# Patient Record
Sex: FEMALE | Race: WHITE | NOT HISPANIC OR LATINO | URBAN - METROPOLITAN AREA
[De-identification: names, ages, dates, MRNs, and addresses within clinical notes are randomized per-mention and may not be internally consistent; named-entity substitution may affect disease eponyms.]

---

## 2020-03-09 ENCOUNTER — OFFICE VISIT (OUTPATIENT)
Dept: URBAN - METROPOLITAN AREA CLINIC 71 | Facility: CLINIC | Age: 75
End: 2020-03-09
Payer: MEDICARE

## 2020-03-09 DIAGNOSIS — H35.361 DRUSEN (DEGENERATIVE) OF MACULA, RIGHT EYE: ICD-10-CM

## 2020-03-09 PROCEDURE — 92134 CPTRZ OPH DX IMG PST SGM RTA: CPT | Performed by: OPTOMETRIST

## 2020-03-09 PROCEDURE — 92014 COMPRE OPH EXAM EST PT 1/>: CPT | Performed by: OPTOMETRIST

## 2020-03-09 ASSESSMENT — VISUAL ACUITY
OS: 20/40
OD: 20/25+

## 2020-03-09 NOTE — IMPRESSION/PLAN
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 Vision affected OD. Plan: Cataracts account for the patient's complaints. Patient understands changing glasses will not significantly improve vision. Cataract surgery should improve vision. Patient willing to have surgery. Recommend cataract surgery OD.

## 2020-03-09 NOTE — IMPRESSION/PLAN
Impression: Other secondary cataract, left eye: H26.492. symptomatic OS Plan: recommend preop for YAG cap OS.

## 2020-03-09 NOTE — IMPRESSION/PLAN
Impression: Ocular hypertension, bilateral: H40.053. had elevated IOP OU before and during cat surgery OS. IOP good today OU. VF/OCT 2/19/18: WNL OU. Pachs: thin OU. Plan: Discussed. Advise further testing. Return for VF/OCT/IOP 1 mo.

## 2020-03-09 NOTE — IMPRESSION/PLAN
Impression: Drusen (degenerative) of macula, right eye: H35.361. mild. OCT mac 3/9/20: no SRF/IRF OU. Plan: observe.

## 2020-07-17 ENCOUNTER — OFFICE VISIT (OUTPATIENT)
Dept: URBAN - METROPOLITAN AREA CLINIC 71 | Facility: CLINIC | Age: 75
End: 2020-07-17
Payer: MEDICARE

## 2020-07-17 DIAGNOSIS — H25.11 NUCLEAR SCLEROSIS CATARACT OF RIGHT EYE: ICD-10-CM

## 2020-07-17 DIAGNOSIS — Z96.1 PRESENCE OF INTRAOCULAR LENS: ICD-10-CM

## 2020-07-17 DIAGNOSIS — H40.053 OCULAR HYPERTENSION, BILATERAL: ICD-10-CM

## 2020-07-17 PROCEDURE — 92014 COMPRE OPH EXAM EST PT 1/>: CPT | Performed by: OPTOMETRIST

## 2020-07-17 PROCEDURE — 92133 CPTRZD OPH DX IMG PST SGM ON: CPT | Performed by: OPTOMETRIST

## 2020-07-17 PROCEDURE — 92083 EXTENDED VISUAL FIELD XM: CPT | Performed by: OPTOMETRIST

## 2020-07-17 ASSESSMENT — VISUAL ACUITY
OD: 20/25
OS: 20/60

## 2020-07-17 ASSESSMENT — INTRAOCULAR PRESSURE
OD: 12
OS: 13

## 2020-07-17 NOTE — IMPRESSION/PLAN
Impression: Ocular hypertension, bilateral: H40.053. had elevated IOP OU before and during cat surgery OS. IOP good today OU. Andrews: VF 7/17/2020: Possible early inf arc OD, grossly within normal limits (3+ PCO),possible eraly changes OS. OCT RNFL: WNL OD, unable to obtain scan of OS ON. Pachs: thin OU. Plan: Discussed. Advise further testing.  next 7/2021 for VF/OCT rnfl

## 2020-08-11 ENCOUNTER — PRE-OPERATIVE VISIT (OUTPATIENT)
Dept: URBAN - METROPOLITAN AREA CLINIC 71 | Facility: CLINIC | Age: 75
End: 2020-08-11
Payer: MEDICARE

## 2020-08-11 DIAGNOSIS — H43.813 VITREOUS DEGENERATION, BILATERAL: ICD-10-CM

## 2020-08-11 DIAGNOSIS — H52.223 REGULAR ASTIGMATISM, BILATERAL: ICD-10-CM

## 2020-08-11 DIAGNOSIS — H25.811 COMBINED FORMS OF AGE-RELATED CATARACT, RIGHT EYE: ICD-10-CM

## 2020-08-11 DIAGNOSIS — H26.492 OTHER SECONDARY CATARACT, LEFT EYE: Primary | ICD-10-CM

## 2020-08-11 PROCEDURE — 92014 COMPRE OPH EXAM EST PT 1/>: CPT | Performed by: OPHTHALMOLOGY

## 2020-08-11 PROCEDURE — 92136 OPHTHALMIC BIOMETRY: CPT | Performed by: OPHTHALMOLOGY

## 2020-08-11 RX ORDER — TOBRAMYCIN 3 MG/ML
0.3 % SOLUTION/ DROPS OPHTHALMIC
Qty: 1 | Refills: 1 | Status: INACTIVE
Start: 2020-08-23 | End: 2020-09-01

## 2020-08-11 ASSESSMENT — PACHYMETRY
OS: 23.25
OS: 4.75
OD: 2.93
OD: 22.98

## 2020-08-11 ASSESSMENT — VISUAL ACUITY
OD: 20/25
OS: 20/60

## 2020-08-11 ASSESSMENT — INTRAOCULAR PRESSURE
OD: 12
OS: 13

## 2020-08-11 NOTE — IMPRESSION/PLAN
Impression: Regular astigmatism, bilateral: H52.223. Plan: WILL CONTINUE TO OBSERVE. PATIENT AWARE THAT GLASSES MAY STILL BE NEEDED AFTER SURGERY.

## 2020-08-11 NOTE — IMPRESSION/PLAN
Impression: Other secondary cataract, left eye: H26.492. Plan: OS: Discussed diagnosis in detail with patient. Discussed treatment options with patient. Discussed risks and benefits and patient understands. Advised patient of condition. Reassured patient of current condition and treatment. Discussed signs and symptoms of retinal detachment. Discussed signs and symptoms of PVD/floaters. Discussed risks of progression. Schedule YAG PI - OS. Surgical treatment is necessary to omprove vision. Patient elects to have surgery. Surgical risks and benefits were discussed, explained and understood by patient. Pre op instructions given and understood. Post op instructions given and understood. Educational materials provided: Yag Capsulotomy. AZAM OKAY TO PROCEED WITH YAG LASER OS ONLY BEFORE PROCEEDING WITH OD CATARACT SURGERY.

## 2020-08-11 NOTE — IMPRESSION/PLAN
Impression: Combined forms of age-related cataract, right eye: H25.811. Plan: OD: Discussed diagnosis in detail with patient. Discussed treatment options with patient. Discussed risks and benefits and patient understands. Advised patient of condition. Reassured patient of current condition and treatment. Emphasized and explained compliance. Discussed signs and symptoms of retinal detachment. Discussed signs and symptoms of PVD/floaters. Discussed risks of progression. New medication(s) Rx given today. Patient elects to have surgery. Surgical treatment is necessary to improve vision. Surgical risks and benefits were discussed, explained and understood by patient. Schedule surgery in the OR. Lid scrubs and hygiene were explained. Patient instructed to use artificial tears as needed. Pre op instructions given and understood. Post op instructions given and understood. Educational materials provided: Cataract and Cataract extraction/lens. PATIENT OKAY TO PROCEED WITH CATARACT SURGERY OD ONLY AFTER YAG OS. TRAD/FAR/TRIMOXI/TOBRAMYCIN BID X10 DAYS BEGINNING THE DAY BEFORE CATARACT SURGERY. Rachel Zamorano

## 2020-08-17 ENCOUNTER — SURGERY (OUTPATIENT)
Dept: URBAN - METROPOLITAN AREA SURGERY 44 | Facility: SURGERY | Age: 75
End: 2020-08-17
Payer: MEDICARE

## 2020-08-17 ENCOUNTER — SURGERY (OUTPATIENT)
Dept: URBAN - METROPOLITAN AREA SURGERY 45 | Facility: SURGERY | Age: 75
End: 2020-08-17
Payer: MEDICARE

## 2020-08-17 PROCEDURE — 66821 AFTER CATARACT LASER SURGERY: CPT | Performed by: OPHTHALMOLOGY

## 2020-08-24 ENCOUNTER — SURGERY (OUTPATIENT)
Dept: URBAN - METROPOLITAN AREA SURGERY 44 | Facility: SURGERY | Age: 75
End: 2020-08-24
Payer: MEDICARE

## 2020-08-24 ENCOUNTER — SURGERY (OUTPATIENT)
Dept: URBAN - METROPOLITAN AREA SURGERY 45 | Facility: SURGERY | Age: 75
End: 2020-08-24
Payer: MEDICARE

## 2020-08-24 PROCEDURE — 66984 XCAPSL CTRC RMVL W/O ECP: CPT | Performed by: OPHTHALMOLOGY

## 2020-08-25 ENCOUNTER — POST-OPERATIVE VISIT (OUTPATIENT)
Dept: URBAN - METROPOLITAN AREA CLINIC 71 | Facility: CLINIC | Age: 75
End: 2020-08-25
Payer: MEDICARE

## 2020-08-25 DIAGNOSIS — Z48.810 ENCOUNTER FOR SURGICAL AFTERCARE FOLLOWING SURGERY ON A SENSE ORGAN: Primary | ICD-10-CM

## 2020-08-25 PROCEDURE — 99024 POSTOP FOLLOW-UP VISIT: CPT | Performed by: OPHTHALMOLOGY

## 2020-08-25 ASSESSMENT — INTRAOCULAR PRESSURE
OS: 13
OD: 16

## 2020-08-25 NOTE — IMPRESSION/PLAN
Impression: S/P sa60at 22.5 far. trimoxi OD - 1 Day. Encounter for surgical aftercare following surgery on a sense organ  Z48.810.  Post operative instructions reviewed - Condition is improving - Plan: Continue with 4 week PO Ref --Continue ciprofloxacin 0.3%

## 2023-03-31 ENCOUNTER — OFFICE VISIT (OUTPATIENT)
Dept: URBAN - METROPOLITAN AREA CLINIC 71 | Facility: CLINIC | Age: 78
End: 2023-03-31
Payer: COMMERCIAL

## 2023-03-31 DIAGNOSIS — H04.123 DRY EYE SYNDROME OF BILATERAL LACRIMAL GLANDS: ICD-10-CM

## 2023-03-31 DIAGNOSIS — H35.361 DRUSEN (DEGENERATIVE) OF MACULA, RIGHT EYE: ICD-10-CM

## 2023-03-31 DIAGNOSIS — H43.813 VITREOUS DEGENERATION, BILATERAL: Primary | ICD-10-CM

## 2023-03-31 DIAGNOSIS — H52.4 PRESBYOPIA: ICD-10-CM

## 2023-03-31 PROCEDURE — 99213 OFFICE O/P EST LOW 20 MIN: CPT

## 2023-03-31 ASSESSMENT — INTRAOCULAR PRESSURE
OS: 16
OD: 11

## 2023-03-31 ASSESSMENT — VISUAL ACUITY
OS: 20/20
OD: 20/20

## 2023-03-31 NOTE — IMPRESSION/PLAN
Impression: Drusen (degenerative) of macula, right eye: H35.361. Plan: Observe, recommend Mac OCT at next visit. Discussed with patient that it can be a precursor to AMD but more likely a benign age related change at this point as it has not shown progression/change.

## 2023-03-31 NOTE — IMPRESSION/PLAN
Impression: Dry eye syndrome of bilateral lacrimal glands: H04.123. Plan: Recommended increased use of ATs 4-6x/day; discussed effects of dry eyes on quality of vision & symptoms associated.

## 2023-03-31 NOTE — IMPRESSION/PLAN
Impression: Presbyopia: H52.4. Plan: Issued new spec Rx, discussed changes and possible adaptation period.  

RTC 1yr or prn for CEE/ DFE

## 2024-09-18 NOTE — IMPRESSION/PLAN
Impression: Vitreous degeneration, bilateral: H43.813. Plan: OBSERVED, NO TREATMENT REQUIRED AT THIS TIME. Past Medical History:   Diagnosis Date    Premature atrial contractions     PVC (premature ventricular contraction)     Seizure disorder        Past Surgical History:   Procedure Laterality Date    ABLATION N/A 2024    Procedure: Ablation;  Surgeon: Jose Verma MD;  Location: [facility] EP LAB;  Service: Cardiology;  Laterality: N/A;  PAC, RFA, Carto, MAC, GP, 3 prep     SECTION      CORONARY ANGIOGRAPHY N/A 3/8/2024    Procedure: ANGIOGRAM, CORONARY ARTERY;  Surgeon: Leopoldo Comer Jr., MD;  Location: [facility] CATH LAB;  Service: Cardiology;  Laterality: N/A;    ENDOMETRIAL ABLATION      HERNIA REPAIR      Tonsillectomy         Review of patient's allergies indicates:  No Known Allergies    No current facility-administered medications on file prior to encounter.     Current Outpatient Medications on File Prior to Encounter   Medication Sig    atorvastatin (LIPITOR) 20 MG tablet TAKE 1 TABLET BY MOUTH EVERY DAY    carBAMazepine (CARBATROL) 300 MG CM12 TAKE 1 CAPSULE BY MOUTH TWICE A DAY (Patient taking differently: Take 300 mg by mouth once daily.)    ergocalciferol (ERGOCALCIFEROL) 50,000 unit Cap Take 1 capsule by mouth once a week.    famotidine (PEPCID AC ORAL) Take 1 tablet by mouth daily as needed (Heartburn).    risedronate (ACTONEL) 35 MG tablet Take 35 mg by mouth every 7 days.     Family History    None       Tobacco Use    Smoking status: Never    Smokeless tobacco: Never   Substance and Sexual Activity    Alcohol use: Yes     Comment: Socially    Drug use: No    Sexual activity: Yes     Partners: Male     Comment:      Review of Systems   All other systems reviewed and are negative.    Objective:     Vital Signs (Most Recent):  Temp: 97.6 °F (36.4 °C) (24 1700)  Pulse: 65 (24 1700)  Resp: 18 (24 1700)  BP: (!) 144/67 (24 1700)  SpO2: 98 % (24 1700) Vital Signs (24h Range):  Temp:  [97.6 °F (36.4 °C)-98.4 °F (36.9 °C)] 97.6 °F (36.4 °C)  Pulse:   [] 65  Resp:  [18-20] 18  SpO2:  [96 %-98 %] 98 %  BP: (131-145)/(67-97) 144/67     Weight: 68 kg (150 lb)  Body mass index is 28.34 kg/m².    SpO2: 98 %         Intake/Output Summary (Last 24 hours) at 9/18/2024 1704  Last data filed at 9/18/2024 1443  Gross per 24 hour   Intake 500 ml   Output --   Net 500 ml       Lines/Drains/Airways       None                    Physical Exam  Vitals and nursing note reviewed.   Constitutional:       General: She is not in acute distress.     Appearance: Normal appearance. She is not ill-appearing.   HENT:      Head: Normocephalic and atraumatic.      Nose: Nose normal.      Mouth/Throat:      Mouth: Mucous membranes are moist.      Pharynx: Oropharynx is clear.   Eyes:      Extraocular Movements: Extraocular movements intact.      Conjunctiva/sclera: Conjunctivae normal.   Cardiovascular:      Rate and Rhythm: Normal rate and regular rhythm.      Pulses: Normal pulses.      Heart sounds: No murmur heard.     No friction rub. No gallop.   Pulmonary:      Effort: Pulmonary effort is normal.      Breath sounds: Normal breath sounds.   Abdominal:      General: Abdomen is flat.      Palpations: Abdomen is soft.      Tenderness: There is no abdominal tenderness.   Musculoskeletal:         General: No swelling or tenderness. Normal range of motion.      Cervical back: Normal range of motion and neck supple.   Skin:     General: Skin is warm and dry.      Capillary Refill: Capillary refill takes less than 2 seconds.   Neurological:      General: No focal deficit present.      Mental Status: She is alert and oriented to person, place, and time.      Motor: No weakness.   Psychiatric:         Mood and Affect: Mood normal.         Thought Content: Thought content normal.          Significant Labs:   Recent Lab Results  (Last 5 results in the past 24 hours)        09/18/24  1534   09/18/24  1533   09/18/24  1223   09/18/24  1211   09/18/24  1148        Albumin       3.8         ALP        99         ALT       19         Anion Gap       6         Appearance, UA     Clear           AST       19         Bacteria, UA     Rare           Baso #       0.04         Basophil %       0.7         Bilirubin (UA)     Negative           BILIRUBIN TOTAL       0.3  Comment: For infants and newborns, interpretation of results should be based  on gestational age, weight and in agreement with clinical  observations.    Premature Infant recommended reference ranges:  Up to 24 hours.............<8.0 mg/dL  Up to 48 hours............<12.0 mg/dL  3-5 days..................<15.0 mg/dL  6-29 days.................<15.0 mg/dL           BNP       97  Comment: Values of less than 100 pg/ml are consistent with non-CHF populations.         BUN       16         Calcium       9.4         Chloride       107         CO2       25         Color, UA     Colorless           Creatinine       0.8         Differential Method       Automated         eGFR       >60.0         Eos #       0.1         Eos %       2.1         Free T4       0.83         Glucose       96         Glucose, UA     Negative           Gran # (ANC)       3.3         Gran %       56.1         Hematocrit       38.4         Hemoglobin       12.7         Hepatitis C Ab       Non-reactive         HIV 1/2 Ag/Ab       Non-reactive         Immature Grans (Abs)       0.00  Comment: Mild elevation in immature granulocytes is non specific and   can be seen in a variety of conditions including stress response,   acute inflammation, trauma and pregnancy. Correlation with other   laboratory and clinical findings is essential.           Immature Granulocytes       0.0         Ketones, UA     Negative           Leukocyte Esterase, UA     1+           Lymph #       1.7         Lymph %       28.7         Magnesium        2.0         MCH       32.6         MCHC       33.1         MCV       99         Microscopic Comment     SEE COMMENT  Comment: Other formed elements not mentioned in  the report are not   present in the microscopic examination.              Mono #       0.7         Mono %       12.4         MPV       10.1         NITRITE UA     Negative           nRBC       0         Blood, UA     Negative           QRS Duration 92   92       86       [facility] QTC Calculation 427   419       481       pH, UA     7.0           Platelet Count       246         Potassium       4.1         PROTEIN TOTAL       7.1         Protein, UA     Negative  Comment: Recommend a 24 hour urine protein or a urine   protein/creatinine ratio if globulin induced proteinuria is  clinically suspected.             RBC       3.89         RBC, UA     0           RDW       12.5         Sodium       138         Spec Grav UA     1.010           Specimen UA     Urine, Clean Catch           Troponin I       <0.006  Comment: The reference interval for Troponin I represents the 99th percentile   cutoff   for our facility and is consistent with 3rd generation assay   performance.           TSH       0.388         WBC, UA     2           WBC       5.81